# Patient Record
Sex: FEMALE | Race: WHITE
[De-identification: names, ages, dates, MRNs, and addresses within clinical notes are randomized per-mention and may not be internally consistent; named-entity substitution may affect disease eponyms.]

---

## 2020-06-23 ENCOUNTER — HOSPITAL ENCOUNTER (INPATIENT)
Dept: HOSPITAL 41 - JD.OB | Age: 35
LOS: 1 days | Discharge: HOME | DRG: 560 | End: 2020-06-24
Attending: OBSTETRICS & GYNECOLOGY | Admitting: OBSTETRICS & GYNECOLOGY
Payer: COMMERCIAL

## 2020-06-23 DIAGNOSIS — Z3A.39: ICD-10-CM

## 2020-06-23 DIAGNOSIS — O40.3XX0: Primary | ICD-10-CM

## 2020-06-23 PROCEDURE — 3E0R3BZ INTRODUCTION OF ANESTHETIC AGENT INTO SPINAL CANAL, PERCUTANEOUS APPROACH: ICD-10-PCS | Performed by: OBSTETRICS & GYNECOLOGY

## 2020-06-23 PROCEDURE — 00HU33Z INSERTION OF INFUSION DEVICE INTO SPINAL CANAL, PERCUTANEOUS APPROACH: ICD-10-PCS | Performed by: OBSTETRICS & GYNECOLOGY

## 2020-06-23 PROCEDURE — 0KQM0ZZ REPAIR PERINEUM MUSCLE, OPEN APPROACH: ICD-10-PCS | Performed by: OBSTETRICS & GYNECOLOGY

## 2020-06-23 PROCEDURE — 3E033VJ INTRODUCTION OF OTHER HORMONE INTO PERIPHERAL VEIN, PERCUTANEOUS APPROACH: ICD-10-PCS | Performed by: OBSTETRICS & GYNECOLOGY

## 2020-06-23 PROCEDURE — 10907ZC DRAINAGE OF AMNIOTIC FLUID, THERAPEUTIC FROM PRODUCTS OF CONCEPTION, VIA NATURAL OR ARTIFICIAL OPENING: ICD-10-PCS | Performed by: OBSTETRICS & GYNECOLOGY

## 2020-06-23 RX ADMIN — WITCH HAZEL PRN TUB: 500 SOLUTION RECTAL; TOPICAL at 18:20

## 2020-06-23 NOTE — PCM.DEL
L & D Note





- General Info


Date of Service: 20





- Delivery Note


Labor: Induced by ARM, Induced by Oxytocin


Delivery Outcome: Livebirth


Infant Delivery Mode: Spontaneous


Birth Presentation: Right Occiput Anterior (MARYLOU)


Nuchal Cord: None


Anesthesia Type: Epidural


Amniotic Fluid Description: Clear


Episiotomy Type: None


Laceration: 2nd Degree


Suture type: Vicryl


Suture size: 2-0


Placenta: Intact, Spontaneous


Cord: 3 Vessels


Estimated Blood Loss: 200


Resuscitation Needed: Yes


: Bulb Syringe, Stimulated, Warmed, [place] Used


Delivery Comments (Free Text/Narrative):: 





Patient found to be complete and began pushing.  With maternal pushing effort 

fetal head delivered from an MARYLOU presentation.  No nuchal cord present.  With 

gentle downward traction the fetal shoulders and body delivered.  Infant placed 

on maternal abdomen.  Cord clamped and cut.  Cord blood obtained.  Placenta 

allowed time to separate and expelled intact.  Inspection of the perineum showed

a 2nd degree laceration which was repaired with a 2-0 Vicryl in the typical 

fashion 





- General Info


Date of Service: 20





- Patient Data


Vitals - Most Recent: 


                                Last Vital Signs











Temp  36.6 C   20 03:33


 


Pulse  84   20 03:33


 


Resp  16   20 03:33


 


BP  118/81   20 03:33


 


Pulse Ox  98   20 03:33











Weight - Most Recent: 91.989 kg


I&O - Last 24 Hours: 


                                 Intake & Output











 20





 06:59 14:59 22:59


 


Intake Total 100  3500


 


Output Total  100 


 


Balance 100 -100 3500











Lab Results Last 24 Hours: 


                         Laboratory Results - last 24 hr











  20 Range/Units





  03:50 03:50 


 


WBC  11.53 H   (3.98-10.04)  K/mm3


 


RBC  4.32   (3.98-5.22)  M/mm3


 


Hgb  12.1   (11.2-15.7)  gm/dl


 


Hct  37.2   (34.1-44.9)  %


 


MCV  86.1   (79.4-94.8)  fl


 


MCH  28.0   (25.6-32.2)  pg


 


MCHC  32.5   (32.2-35.5)  g/dl


 


RDW Std Deviation  41.5   (36.4-46.3)  fL


 


Plt Count  326   (182-369)  K/mm3


 


MPV  10.5   (9.4-12.3)  fl


 


Neut % (Auto)  69.3   (34.0-71.1)  %


 


Lymph % (Auto)  21.9   (19.3-51.7)  %


 


Mono % (Auto)  7.3   (4.7-12.5)  %


 


Eos % (Auto)  0.9   (0.7-5.8)  


 


Baso % (Auto)  0.3   (0.1-1.2)  %


 


Neut # (Auto)  7.99 H   (1.56-6.13)  K/mm3


 


Lymph # (Auto)  2.53   (1.18-3.74)  K/mm3


 


Mono # (Auto)  0.84 H   (0.24-0.36)  K/mm3


 


Eos # (Auto)  0.10   (0.04-0.36)  K/mm3


 


Baso # (Auto)  0.03   (0.01-0.08)  K/mm3


 


Blood Type   O NEGATIVE  


 


Gel Antibody Screen   Positive  











Med Orders - Current: 


                               Current Medications





Calcium Carbonate/Glycine (Tums)  1,000 mg PO Q2H PRN


   PRN Reason: Indigestion


Ephedrine Sulfate (Ephedrine Sulfate)  5 mg IVPUSH ASDIRECTED PRN


   PRN Reason: Hypotension


Fentanyl (Sublimaze)  100 mcg EPIDUR Q3H PRN


   PRN Reason: Pain


   Last Admin: 20 09:42 Dose:  100 mcg


   Documented by: 


Fentanyl/Bupivacaine HCl (Fentanyl/Bupivacaine/Ns 2 Mcg-0.125% 100 Ml)  100 ml 

EPIDUR ASDIRECTED CARIE


   Last Admin: 20 09:41 Dose:  100 ml


   Documented by: 


Ampicillin Sodium 1 gm/ Sodium (Chloride)  100 mls @ 200 mls/hr IV Q4H CARIE


   Last Admin: 20 12:23 Dose:  200 mls/hr


   Documented by: 


Oxytocin/Lactated Ringer's (Pitocin In Lr 10 Units/1,000 Ml)  10 unit in 1,000 

mls @ 12 mls/hr IV TITRATE CARIE; Protocol


   Last Titration: 20 14:50 Dose:  83.33 munits/min, 500 mls/hr


   Documented by: 


Oxytocin/Lactated Ringer's (Pitocin In Lr 10 Units/1,000 Ml)  10 unit in 1,000 

mls @ 100 mls/hr IV .CONTINUOUS CARIE; Protocol


Lactated Ringer's (Ringers, Lactated)  1,000 mls @ 100 mls/hr IV ASDIRECTED CARIE


   Last Admin: 20 13:28 Dose:  999 mls/hr


   Documented by: 


Phenylephrine HCl 1 mg/ Sodium (Chloride)  10.1 mls @ 1 mls/sec IV TITRATE CARIE; 

Protocol


Nalbuphine HCl (Nubain)  10 mg IVPUSH Q2H PRN


   PRN Reason: Pain


   Last Admin: 20 10:30 Dose:  10 mg


   Documented by: 


Ondansetron HCl (Zofran)  4 mg IVPUSH Q4H PRN


   PRN Reason: Nausea/Vomiting


Ondansetron HCl (Zofran)  4 mg IVPUSH ONETIME PRN


   PRN Reason: Nausea/Vomiting


Sodium Chloride (Saline Flush)  10 ml FLUSH ASDIRECTED PRN


   PRN Reason: Keep Vein Open





Discontinued Medications





Ampicillin Sodium 2 gm/ Sodium (Chloride)  100 mls @ 200 mls/hr IV ONETIME ONE


   Stop: 20 04:01


   Last Admin: 20 04:05 Dose:  200 mls/hr


   Documented by: 


Lidocaine HCl (Xylocaine 1%)  50 ml INJECT ONETIME ONE


   Stop: 20 03:33











- Problem List & Annotations


(1) 39 weeks gestation of pregnancy


SNOMED Code(s): 30991194


   Code(s): Z3A.39 - 39 WEEKS GESTATION OF PREGNANCY   Status: Acute   Current 

Visit: Yes   





(2) Rh negative state in antepartum period


SNOMED Code(s): 523191430


   Code(s): O26.899 - OTH PREGNANCY RELATED CONDITIONS, UNSPECIFIED TRIMESTER; 

Z67.91 - UNSPECIFIED BLOOD TYPE, RH NEGATIVE   Status: Acute   Current Visit: Y

es   





(3) GBS (group B Streptococcus carrier), +RV culture, currently pregnant


SNOMED Code(s): 6282718979917, 975670751, 8958785328314


   Code(s): O99.820 - STREPTOCOCCUS B CARRIER STATE COMPLICATING PREGNANCY   

Status: Acute   Current Visit: Yes   





(4) Polyhydramnios


SNOMED Code(s): 91821457


   Code(s): O40.9XX0 - POLYHYDRAMNIOS, UNSP TRIMESTER, NOT APPLICABLE OR UNSP   

Status: Acute   Current Visit: Yes   


Qualifiers: 


   Fetus number: single or unspecified fetus   Trimester: third trimester   

Qualified Code(s): O40.3XX0 - Polyhydramnios, third trimester, not applicable or

unspecified   





(5) Meconium stained amniotic fluid, delivered, current hospitalization


SNOMED Code(s): 454676862, 363967608


   Code(s): O77.0 - LABOR AND DELIVERY COMPLICATED BY MECONIUM IN AMNIOTIC FLUID

  Status: Acute   Current Visit: Yes   





(6) Vaginal delivery


SNOMED Code(s): 248013072


   Code(s): O80 - ENCOUNTER FOR FULL-TERM UNCOMPLICATED DELIVERY   Status: Acute

  Current Visit: Yes   





- Problem List Review


Problem List Initiated/Reviewed/Updated: Yes





- My Orders


Last 24 Hours: 


My Active Orders





20 03:32


Urinary Catheter Assessment [RC] ASDIRECTED 


Calcium Carbonate [Tums]   1,000 mg PO Q2H PRN 


Nalbuphine [Nubain]   10 mg IVPUSH Q2H PRN 


Ondansetron [Zofran]   4 mg IVPUSH Q4H PRN 


Sodium Chloride 0.9% [Saline Flush]   10 ml FLUSH ASDIRECTED PRN 


Resuscitation Status Routine 





20 03:33


Patient Status [ADT] Routine 


Activity as Tolerated [RC] PFP 


Communication Order [RC] ASDIRECTED 


Fetal Heart Tones [RC] ASDIRECTED 


Notify Provider [RC] PFP 


Notify Provider [RC] PRN 


Peripheral IV Care [RC] .AS DIRECTED 


Vital Signs [RC] PER UNIT ROUTINE 


Electronic Fetal Heart Tones Ext w TOCO [WOMSER] Routine 


Electronic Fetal Heart Tones Internal [WOMSER] Per Unit Routine 


Peripheral IV Insertion Adult [OM.PC] Routine 





20 03:45


Lactated Ringers [Ringers, Lactated] 1,000 ml IV ASDIRECTED 


Oxytocin/Lactated Ringers [Pitocin in LR 10 Units/1,000 ML] 10 unit in 1,000 ml 

IV .CONTINUOUS 


Oxytocin/Lactated Ringers [Pitocin in LR 10 Units/1,000 ML] 10 unit in 1,000 ml 

IV TITRATE 





20 03:50


ANTIBODY IDENTIFICATION [BBK] Stat 


RAPID PLASMA REAGIN,RPR [CHEM] Routine 


TYPE AND SCREEN [BBK] Stat 





20 Breakfast


Regular Diet [DIET] 





20 07:30


Ampicillin 1 gm   Sodium Chloride 0.9% [Normal Saline] 100 ml IV Q4H 





20 17:26


Patient Status Manage Transfer [TRANSFER] Routine 














- Assessment


Assessment:: 





PPD#0





- Plan


Plan:: 





* Routine cares 


* Breast feeding 


* Discharge home in 1-2 days 


* Assess for need of additional Rhogam after delivery

## 2020-06-23 NOTE — PCM.PREANE
Preanesthetic Assessment





- Anesthesia/Transfusion/Family Hx


Anesthesia History: Prior Anesthesia Without Reaction


Family History of Anesthesia Reaction: No


Transfusion History: No Prior Transfusion(s)


Intubation History: Unknown





- Review of Systems


General: No Symptoms (sore throat/seasonal allergies.)


Pulmonary: No Symptoms


Cardiovascular: Palpitations


Gastrointestinal: No Symptoms (GERD with pregnancy)


Neurological: No Symptoms


Other: Reports: Sinus Problem (seasonal allergies), Depression





- Physical Assessment


NPO Status Date: 20


NPO Status Time: 07:30


Vital Signs: 





                                Last Vital Signs











Temp  36.6 C   20 03:33


 


Pulse  84   20 03:33


 


Resp  16   20 03:33


 


BP  118/81   20 03:33


 


Pulse Ox  98   20 03:33











Height: 1.63 m


Weight: 91.989 kg


ASA Class: 2


Mental Status: Alert & Oriented x3


Airway Class: Mallampati = 2


Dentition: Reports: Normal Dentition, Caries


Thyro-Mental Finger Breadths: 3


Mouth Opening Finger Breadths: 3


ROM/Head Extension: Full


Lungs: Clear to Auscultation, Normal Respiratory Effort


Cardiovascular: Regular Rate, Regular Rhythm, No Murmurs





- Lab


Values: 





                             Laboratory Last Values











WBC  11.53 K/mm3 (3.98-10.04)  H  20  03:50    


 


RBC  4.32 M/mm3 (3.98-5.22)   20  03:50    


 


Hgb  12.1 gm/dl (11.2-15.7)   20  03:50    


 


Hct  37.2 % (34.1-44.9)   20  03:50    


 


MCV  86.1 fl (79.4-94.8)   20  03:50    


 


MCH  28.0 pg (25.6-32.2)   20  03:50    


 


MCHC  32.5 g/dl (32.2-35.5)   20  03:50    


 


RDW Std Deviation  41.5 fL (36.4-46.3)   20  03:50    


 


Plt Count  326 K/mm3 (182-369)   20  03:50    


 


MPV  10.5 fl (9.4-12.3)   20  03:50    


 


Neut % (Auto)  69.3 % (34.0-71.1)   20  03:50    


 


Lymph % (Auto)  21.9 % (19.3-51.7)   20  03:50    


 


Mono % (Auto)  7.3 % (4.7-12.5)   20  03:50    


 


Eos % (Auto)  0.9  (0.7-5.8)   20  03:50    


 


Baso % (Auto)  0.3 % (0.1-1.2)   20  03:50    


 


Neut # (Auto)  7.99 K/mm3 (1.56-6.13)  H  20  03:50    


 


Lymph # (Auto)  2.53 K/mm3 (1.18-3.74)   20  03:50    


 


Mono # (Auto)  0.84 K/mm3 (0.24-0.36)  H  20  03:50    


 


Eos # (Auto)  0.10 K/mm3 (0.04-0.36)   20  03:50    


 


Baso # (Auto)  0.03 K/mm3 (0.01-0.08)   20  03:50    


 


Blood Type  O NEGATIVE   20  03:50    








Above labs reviewed and noted and within acceptable ranges to proceed with 

epidural if desired.





- Allergies


Allergies/Adverse Reactions: 


                                    Allergies











Allergy/AdvReac Type Severity Reaction Status Date / Time


 


Dairy Products Allergy  Diarrhea Verified 20 03:35














- Anesthesia Plan


Pre-Op Medication Ordered: None





- Acknowledgements


Anesthesia Type Planned: Epidural


Pt an Appropriate Candidate for the Planned Anesthesia: Yes


Alternatives and Risks of Anesthesia Discussed w Pt/Guardian: Yes


Pt/Guardian Understands and Agrees with Anesthesia Plan: Yes





PreAnesthesia Questionnaire


OB/GYN History: Reports: Pregnancy, Spontaneous , Other (See Below)


Other OB/BYN History: polyhydramnios


Psychiatric History: Reports: Depression


Immunologic History: Reports: Other (See Below)


Other Immunologic History: EBV mono





- Infectious Disease History


Infectious Disease History: Reports: Chicken Pox





- Past Surgical History


HEENT Surgical History: Reports: Myringotomy w Tube(s), Oral Surgery, Other (See

Below)


Other HEENT Surgeries/Procedures: cauterization of adenoids 





- SUBSTANCE USE


Smoking Status *Q: Never Smoker


Recreational Drug Use History: No





- HOME MEDS


Home Medications: 


                                    Home Meds





Prenatal Vits #93/Iron Fum/FA [Prenatal Formula Tablet] 1 each PO 06/15/20 

[History]











- CURRENT (IN HOUSE) MEDS


Current Meds: 





                               Current Medications





Calcium Carbonate/Glycine (Tums)  1,000 mg PO Q2H PRN


   PRN Reason: Indigestion


Ampicillin Sodium 1 gm/ Sodium (Chloride)  100 mls @ 200 mls/hr IV Q4H CARIE


Oxytocin/Lactated Ringer's (Pitocin In Lr 10 Units/1,000 Ml)  10 unit in 1,000 

mls @ 12 mls/hr IV TITRATE CARIE; Protocol


   Last Titration: 20 06:51 Dose:  4 munits/min, 24 mls/hr


   Documented by: 


Oxytocin/Lactated Ringer's (Pitocin In Lr 10 Units/1,000 Ml)  10 unit in 1,000 

mls @ 100 mls/hr IV .CONTINUOUS CARIE; Protocol


Lactated Ringer's (Ringers, Lactated)  1,000 mls @ 100 mls/hr IV ASDIRECTED CARIE


   Last Admin: 20 06:17 Dose:  100 mls/hr


   Documented by: 


Nalbuphine HCl (Nubain)  10 mg IVPUSH Q2H PRN


   PRN Reason: Pain


Ondansetron HCl (Zofran)  4 mg IVPUSH Q4H PRN


   PRN Reason: Nausea/Vomiting


Sodium Chloride (Saline Flush)  10 ml FLUSH ASDIRECTED PRN


   PRN Reason: Keep Vein Open





Discontinued Medications





Ampicillin Sodium 2 gm/ Sodium (Chloride)  100 mls @ 200 mls/hr IV ONETIME ONE


   Stop: 20 04:01


   Last Admin: 20 04:05 Dose:  200 mls/hr


   Documented by: 


Lidocaine HCl (Xylocaine 1%)  50 ml INJECT ONETIME ONE


   Stop: 20 03:33

## 2020-06-23 NOTE — PCM.LDHP
L&D History of Present Illness





- General


Date of Service: 20


Admit Problem/Dx: 


                   Patient Status Order with Admit Dx/Problem





20 03:33


Patient Status [ADT] Routine 








                           Admission Diagnosis/Problem











Admission Diagnosis/Problem    Pregnancy














Source of Information: Patient


History Limitations: Reports: No Limitations





- History of Present Illness


Introduction:: 





Patient is a 36 y/o  at 39 3/7 wks who presented in early labor / also 

supposed to be induced today.  Doing well.  No major issues 





- Related Data


Allergies/Adverse Reactions: 


                                    Allergies











Allergy/AdvReac Type Severity Reaction Status Date / Time


 


Dairy Products Allergy  Diarrhea Verified 20 03:35











Home Medications: 


                                    Home Meds





Prenatal Vits #93/Iron Fum/FA [Prenatal Formula Tablet] 1 each PO 06/15/20 

[History]











Past Medical History


OB/GYN History: Reports: Pregnancy, Spontaneous 


: 4


Para: 2


LMP (Approximate): Pregnant


Other OB/BYN History: polyhydramnios


Psychiatric History: Reports: Depression





- Infectious Disease History


Infectious Disease History: Reports: Chicken Pox





- Past Surgical History


HEENT Surgical History: Reports: Myringotomy w Tube(s), Oral Surgery (tooth 

extraction), Other (See Below)


Other HEENT Surgeries/Procedures: cauterization of adenoids 





Social & Family History





- Tobacco Use


Smoking Status *Q: Never Smoker





- Alcohol Use


Alcohol Use History: No





- Recreational Drug Use


Recreational Drug Use: No





H&P Review of Systems





- Review of Systems:


Review Of Systems: See Below


General: Reports: No Symptoms


Pulmonary: Reports: No Symptoms


Cardiovascular: Reports: No Symptoms


Gastrointestinal: Reports: No Symptoms


Genitourinary: Reports: No Symptoms


Musculoskeletal: Reports: No Symptoms


Psychiatric: Reports: Anxiety


Neurological: Reports: No Symptoms





L&D Exam





- Exam


Exam: See Below





- Vital Signs


Vital Signs: 


                                Last Vital Signs











Temp  36.6 C   20 03:33


 


Pulse  84   20 03:33


 


Resp  16   20 03:33


 


BP  118/81   20 03:33


 


Pulse Ox  98   20 03:33











Weight: 91.989 kg





- OB Specific


Contraction Intensity: Mild


Fetal Movement: Active


Fetal Heart Tones: Present


Fetal Heart Tones per Min: 125


Fetal Heart Rate (FHR) Variability: Moderate (6-25 bmp)


Birth Presentation: Vertex





- Limon Score


Limon Score Cervix Position: Midposition


Limon Score Consistency: Soft


Limon Score Effacement: 51-70%


Limon Score Dilation: 1-2 cm


Limon Score Infant's Station: -3


Limon Score Total: 6





- Exam


General: Alert, Oriented, Cooperative


Lungs: Clear to Auscultation, Normal Respiratory Effort


Cardiovascular: Regular Rate, Regular Rhythm


GI/Abdominal Exam: Soft, Non-Tender


Genitourinary: Normal external exam


Extremities: Normal Inspection


Skin: Warm, Dry, Intact





- Patient Data


Lab Results Last 24 hrs: 


                         Laboratory Results - last 24 hr











  20 Range/Units





  03:50 03:50 


 


WBC  11.53 H   (3.98-10.04)  K/mm3


 


RBC  4.32   (3.98-5.22)  M/mm3


 


Hgb  12.1   (11.2-15.7)  gm/dl


 


Hct  37.2   (34.1-44.9)  %


 


MCV  86.1   (79.4-94.8)  fl


 


MCH  28.0   (25.6-32.2)  pg


 


MCHC  32.5   (32.2-35.5)  g/dl


 


RDW Std Deviation  41.5   (36.4-46.3)  fL


 


Plt Count  326   (182-369)  K/mm3


 


MPV  10.5   (9.4-12.3)  fl


 


Neut % (Auto)  69.3   (34.0-71.1)  %


 


Lymph % (Auto)  21.9   (19.3-51.7)  %


 


Mono % (Auto)  7.3   (4.7-12.5)  %


 


Eos % (Auto)  0.9   (0.7-5.8)  


 


Baso % (Auto)  0.3   (0.1-1.2)  %


 


Neut # (Auto)  7.99 H   (1.56-6.13)  K/mm3


 


Lymph # (Auto)  2.53   (1.18-3.74)  K/mm3


 


Mono # (Auto)  0.84 H   (0.24-0.36)  K/mm3


 


Eos # (Auto)  0.10   (0.04-0.36)  K/mm3


 


Baso # (Auto)  0.03   (0.01-0.08)  K/mm3


 


Blood Type   O NEGATIVE  











Result Diagrams: 


                                 20 03:50








- Problem List


(1) 39 weeks gestation of pregnancy


SNOMED Code(s): 15658148


   ICD Code: Z3A.39 - 39 WEEKS GESTATION OF PREGNANCY   Status: Acute   Current 

Visit: Yes   





(2) Rh negative state in antepartum period


SNOMED Code(s): 268390805


   ICD Code: O26.899 - OTH PREGNANCY RELATED CONDITIONS, UNSPECIFIED TRIMESTER; 

Z67.91 - UNSPECIFIED BLOOD TYPE, RH NEGATIVE   Status: Acute   Current Visit: 

Yes   





(3) GBS (group B Streptococcus carrier), +RV culture, currently pregnant


SNOMED Code(s): 4794383053709, 236693461, 9933539588831


   ICD Code: O99.820 - STREPTOCOCCUS B CARRIER STATE COMPLICATING PREGNANCY   

Status: Acute   Current Visit: Yes   





(4) Polyhydramnios


SNOMED Code(s): 53201330


   ICD Code: O40.9XX0 - POLYHYDRAMNIOS, UNSP TRIMESTER, NOT APPLICABLE OR UNSP  

 Status: Acute   Current Visit: Yes   


Qualifiers: 


   Fetus number: single or unspecified fetus   Trimester: third trimester   

Qualified Code(s): O40.3XX0 - Polyhydramnios, third trimester, not applicable or

 unspecified   


Problem List Initiated/Reviewed/Updated: Yes


Orders Last 24hrs: 


                               Active Orders 24 hr











 Category Date Time Status


 


 Patient Status [ADT] Routine ADT  20 03:33 Active


 


 Activity as Tolerated [RC] PFP Care  20 03:33 Active


 


 Communication Order [RC] ASDIRECTED Care  20 03:33 Active


 


 Fetal Heart Tones [RC] ASDIRECTED Care  20 03:33 Active


 


 Notify Provider [RC] PFP Care  20 03:33 Active


 


 Notify Provider [RC] PRN Care  20 03:33 Active


 


 Peripheral IV Care [RC] .AS DIRECTED Care  20 03:33 Active


 


 Urinary Catheter Assessment [RC] ASDIRECTED Care  20 03:32 Active


 


 Vital Signs [RC] PER UNIT ROUTINE Care  20 03:33 Active


 


 Regular Diet [DIET] Diet  20 Breakfast Active


 


 PATIENT RETYPE [BBK] Routine Lab  20 05:22 Ordered


 


 RAPID PLASMA REAGIN,RPR [CHEM] Routine Lab  20 03:50 Received


 


 TYPE AND SCREEN [BBK] Stat Lab  20 03:50 Results


 


 Ampicillin 1 gm Med  20 07:30 Active





 Sodium Chloride 0.9% [Normal Saline] 100 ml   





 IV Q4H   


 


 Calcium Carbonate [Tums] Med  20 03:32 Active





 1,000 mg PO Q2H PRN   


 


 Lactated Ringers [Ringers, Lactated] 1,000 ml Med  20 03:45 Active





 IV ASDIRECTED   


 


 Nalbuphine [Nubain] Med  20 03:32 Active





 10 mg IVPUSH Q2H PRN   


 


 Ondansetron [Zofran] Med  20 03:32 Active





 4 mg IVPUSH Q4H PRN   


 


 Oxytocin/Lactated Ringers [Pitocin in LR 10 Units/1,000 Med  20 03:45 

Active





 ML]   





 10 unit in 1,000 ml IV .CONTINUOUS   


 


 Oxytocin/Lactated Ringers [Pitocin in LR 10 Units/1,000 Med  20 03:45 

Active





 ML]   





 10 unit in 1,000 ml IV TITRATE   


 


 Sodium Chloride 0.9% [Saline Flush] Med  20 03:32 Active





 10 ml FLUSH ASDIRECTED PRN   


 


 Electronic Fetal Heart Tones Ext w TOCO [WOMSER] Oth  20 03:33 Ordered





 Routine   


 


 Electronic Fetal Heart Tones Internal [WOMSER] Per Unit Oth  20 03:33 

Ordered





 Routine   


 


 Peripheral IV Insertion Adult [OM.PC] Routine Oth  20 03:33 Ordered


 


 Resuscitation Status Routine Resus Stat  20 03:32 Ordered








                                Medication Orders





Calcium Carbonate/Glycine (Tums)  1,000 mg PO Q2H PRN


   PRN Reason: Indigestion


Ampicillin Sodium 1 gm/ Sodium (Chloride)  100 mls @ 200 mls/hr IV Q4H CARIE


Oxytocin/Lactated Ringer's (Pitocin In Lr 10 Units/1,000 Ml)  10 unit in 1,000 

mls @ 12 mls/hr IV TITRATE CARIE; Protocol


   Last Admin: 20 06:18  Dose: 2 munits/min, 12 mls/hr


   Documented by: ELKE


Oxytocin/Lactated Ringer's (Pitocin In Lr 10 Units/1,000 Ml)  10 unit in 1,000 

mls @ 100 mls/hr IV .CONTINUOUS CARIE; Protocol


Lactated Ringer's (Ringers, Lactated)  1,000 mls @ 100 mls/hr IV ASDIRECTED CARIE


   Last Admin: 20 06:17  Dose: 100 mls/hr


   Documented by: ELKE


Nalbuphine HCl (Nubain)  10 mg IVPUSH Q2H PRN


   PRN Reason: Pain


Ondansetron HCl (Zofran)  4 mg IVPUSH Q4H PRN


   PRN Reason: Nausea/Vomiting


Sodium Chloride (Saline Flush)  10 ml FLUSH ASDIRECTED PRN


   PRN Reason: Keep Vein Open








Assessment/Plan Comment:: 





* Labs done


* Ampicillin for GBS positive status 


* Was noted to have mild polyhydramnios around 36 weeks, has normalized on 

  recent scan 


* Pitocin for augmentation, AROM when safe/able 


* Pain management per patient preference 


* Anticipate 


* Assess for need of additional Rhogam after delivery

## 2020-06-24 RX ADMIN — WITCH HAZEL PRN CONTAINER: 500 SOLUTION RECTAL; TOPICAL at 13:58

## 2020-06-24 NOTE — PCM.DCSUM1
**Discharge Summary





- Discharge Data


Discharge Date: 20


Discharge Disposition: Home, Self-Care 01


Condition: Good





- Referral to Home Health


Primary Care Physician: 


Adri Madden MD








- Discharge Diagnosis/Problem(s)


(1) 39 weeks gestation of pregnancy


SNOMED Code(s): 92060471


   ICD Code: Z3A.39 - 39 WEEKS GESTATION OF PREGNANCY   Status: Acute   





(2) Rh negative state in antepartum period


SNOMED Code(s): 439153239


   ICD Code: O26.899 - OTH PREGNANCY RELATED CONDITIONS, UNSPECIFIED TRIMESTER; 

Z67.91 - UNSPECIFIED BLOOD TYPE, RH NEGATIVE   Status: Acute   





(3) GBS (group B Streptococcus carrier), +RV culture, currently pregnant


SNOMED Code(s): 0373867970538, 526994196, 4735093273629


   ICD Code: O99.820 - STREPTOCOCCUS B CARRIER STATE COMPLICATING PREGNANCY   

Status: Acute   





(4) Polyhydramnios


SNOMED Code(s): 95215599


   ICD Code: O40.9XX0 - POLYHYDRAMNIOS, UNSP TRIMESTER, NOT APPLICABLE OR UNSP  

Status: Acute   


Qualifiers: 


   Fetus number: single or unspecified fetus   Trimester: third trimester   

Qualified Code(s): O40.3XX0 - Polyhydramnios, third trimester, not applicable or

unspecified   





(5) Meconium stained amniotic fluid, delivered, current hospitalization


SNOMED Code(s): 663225577, 494392692


   ICD Code: O77.0 - LABOR AND DELIVERY COMPLICATED BY MECONIUM IN AMNIOTIC 

FLUID   Status: Acute   





(6) Vaginal delivery


SNOMED Code(s): 164999793


   ICD Code: O80 - ENCOUNTER FOR FULL-TERM UNCOMPLICATED DELIVERY   Status: 

Acute   





- Patient Summary/Data


Complications: None


Consults: 


None


Recommended Follow-up Testing/Procedures: 


Follow up in 3 weeks


Hospital Course: 


Patient is a 36 y/o  at 39 3/7 wks who presented for IOL.  Done with 

pitocin and AROM.  PRogressed well to complete dilation and underwent an 

uncomplicated .  See delivery note.  Postpartum did well and was discharged 

home on PPD#1








- Patient Instructions


Diet: Regular Diet as Tolerated


Activity: As Tolerated


Activity, Other: Pelvic rest for 6 weeks 


Driving: May Drive Today


Showering/Bathing: May Shower


Showering/Bathing, Other: May Bathe 


Notify Provider of: Fever, Increased Pain, Swelling and Redness, Drainage, 

Nausea and/or Vomiting





- Discharge Plan


*PRESCRIPTION DRUG MONITORING PROGRAM REVIEWED*: No


*COPY OF PRESCRIPTION DRUG MONITORING REPORT IN PATIENT JOZEF: No


Home Medications: 


                                    Home Meds





Prenatal Vits #93/Iron Fum/FA [Prenatal Formula Tablet] 1 each PO 06/15/20 

[History]


Docusate Sodium [Colace] 100 mg PO BID PRN  cap 20 [Rx]


Ibuprofen [Motrin] 600 mg PO Q4H PRN  tablet 20 [Rx]








Patient Handouts:  Breast Pumping Tips, Breastfeeding and Self-Care, Postpartum 

Care After Vaginal Delivery


Referrals: 


Adri Madden MD [Primary Care Provider] -  (3 weeks for postpartum check )





- Discharge Summary/Plan Comment


DC Time >30 min.: No





- Patient Data


Vitals - Most Recent: 


                                Last Vital Signs











Temp  36.4 C   20 09:40


 


Pulse  87   20 09:40


 


Resp  14   20 09:40


 


BP  126/67   20 09:40


 


Pulse Ox  100   20 09:40











Weight - Most Recent: 91.989 kg


Med Orders - Current: 


                               Current Medications





Acetaminophen (Tylenol)  650 mg PO Q4H PRN


   PRN Reason: mild pain or fever


   Last Admin: 20 20:48 Dose:  650 mg


   Documented by: 


Benzocaine/Menthol (Dermoplast Pain Relief Spray)  0 gm TOP ASDIRECTED PRN


   PRN Reason: Perineal Comfort Measure


   Last Admin: 20 18:20 Dose:  1 can


   Documented by: 


Docusate Sodium (Colace)  100 mg PO BID PRN


   PRN Reason: Constipation


   Last Admin: 20 12:25 Dose:  100 mg


   Documented by: 


Ibuprofen (Motrin)  600 mg PO Q4H PRN


   PRN Reason: Mild pain or fever


   Last Admin: 20 12:25 Dose:  600 mg


   Documented by: 


Witch Hazel (Tucks)  1 pad TOP ASDIRECTED PRN


   PRN Reason: Perineal Comfort Measure


   Last Admin: 20 13:58 Dose:  1 container


   Documented by: 





Discontinued Medications





Bupivacaine HCl (Sensorcaine-Mpf 0.25%)  10 ml .ROUTE .STK-MED ONE


   Stop: 20 00:01


Calcium Carbonate/Glycine (Tums)  1,000 mg PO Q2H PRN


   PRN Reason: Indigestion


Ephedrine Sulfate (Ephedrine Sulfate)  5 mg IVPUSH ASDIRECTED PRN


   PRN Reason: Hypotension


Fentanyl (Sublimaze)  100 mcg EPIDUR Q3H PRN


   PRN Reason: Pain


   Last Admin: 20 09:42 Dose:  100 mcg


   Documented by: 


Fentanyl/Bupivacaine HCl (Fentanyl/Bupivacaine/Ns 2 Mcg-0.125% 100 Ml)  100 ml 

EPIDUR ASDIRECTED CARIE


   Last Admin: 20 09:41 Dose:  100 ml


   Documented by: 


Ampicillin Sodium 2 gm/ Sodium (Chloride)  100 mls @ 200 mls/hr IV ONETIME ONE


   Stop: 20 04:01


   Last Admin: 20 04:05 Dose:  200 mls/hr


   Documented by: 


Ampicillin Sodium 1 gm/ Sodium (Chloride)  100 mls @ 200 mls/hr IV Q4H CARIE


   Last Admin: 20 11:26 Dose:  Not Given


   Documented by: 


Oxytocin/Lactated Ringer's (Pitocin In Lr 10 Units/1,000 Ml)  10 unit in 1,000 

mls @ 12 mls/hr IV TITRATE CARIE; Protocol


   Last Titration: 20 14:50 Dose:  83.33 munits/min, 500 mls/hr


   Documented by: 


Oxytocin/Lactated Ringer's (Pitocin In Lr 10 Units/1,000 Ml)  10 unit in 1,000 

mls @ 100 mls/hr IV .CONTINUOUS CARIE; Protocol


Lactated Ringer's (Ringers, Lactated)  1,000 mls @ 100 mls/hr IV ASDIRECTED CARIE


   Last Admin: 20 13:28 Dose:  999 mls/hr


   Documented by: 


Phenylephrine HCl 1 mg/ Sodium (Chloride)  10.1 mls @ 1 mls/sec IV TITRATE CARIE; 

Protocol


Lidocaine HCl (Xylocaine 1%)  50 ml INJECT ONETIME ONE


   Stop: 20 03:33


   Last Admin: 20 03:55 Dose:  Not Given


   Documented by: 


Nalbuphine HCl (Nubain)  10 mg IVPUSH Q2H PRN


   PRN Reason: Pain


   Last Admin: 20 10:30 Dose:  10 mg


   Documented by: 


Ondansetron HCl (Zofran)  4 mg IVPUSH Q4H PRN


   PRN Reason: Nausea/Vomiting


Ondansetron HCl (Zofran)  4 mg IVPUSH ONETIME PRN


   PRN Reason: Nausea/Vomiting


Sodium Chloride (Saline Flush)  10 ml FLUSH ASDIRECTED PRN


   PRN Reason: Keep Vein Open

## 2020-06-24 NOTE — PCM.PNPP
- General Info


Date of Service: 20


Functional Status: Reports: Pain Controlled, Tolerating Diet, Ambulating, 

Urinating





- Review of Systems


General: Reports: No Symptoms


Pulmonary: Reports: No Symptoms


Cardiovascular: Reports: No Symptoms


Gastrointestinal: Reports: No Symptoms


Genitourinary: Reports: No Symptoms


Musculoskeletal: Reports: No Symptoms


Neurological: Reports: No Symptoms





- Patient Data


Vital Signs - Most Recent: 


                                Last Vital Signs











Temp  36.7 C   20 02:57


 


Pulse  81   20 02:57


 


Resp  14   20 02:57


 


BP  104/68   20 02:57


 


Pulse Ox  97   20 02:57











Weight - Most Recent: 91.989 kg


I&O - Last 24 Hours: 


                                 Intake & Output











 20





 14:59 22:59 06:59


 


Intake Total  3500 


 


Output Total 100  


 


Balance -100 3500 











Lab Results - Last 24 Hours: 


                         Laboratory Results - last 24 hr











  20 Range/Units





  03:50 


 


Blood Type  O NEGATIVE  


 


Gel Antibody Screen  Positive  











Med Orders - Current: 


                               Current Medications





Acetaminophen (Tylenol)  650 mg PO Q4H PRN


   PRN Reason: mild pain or fever


   Last Admin: 20 20:48 Dose:  650 mg


   Documented by: 


Benzocaine/Menthol (Dermoplast Pain Relief Spray)  0 gm TOP ASDIRECTED PRN


   PRN Reason: Perineal Comfort Measure


   Last Admin: 20 18:20 Dose:  1 can


   Documented by: 


Docusate Sodium (Colace)  100 mg PO BID PRN


   PRN Reason: Constipation


   Last Admin: 20 20:48 Dose:  100 mg


   Documented by: 


Ibuprofen (Motrin)  600 mg PO Q4H PRN


   PRN Reason: Mild pain or fever


   Last Admin: 20 02:59 Dose:  600 mg


   Documented by: 


Witch Hazel (Tucks)  1 pad TOP ASDIRECTED PRN


   PRN Reason: Perineal Comfort Measure


   Last Admin: 20 18:20 Dose:  1 tub


   Documented by: 





Discontinued Medications





Calcium Carbonate/Glycine (Tums)  1,000 mg PO Q2H PRN


   PRN Reason: Indigestion


Ephedrine Sulfate (Ephedrine Sulfate)  5 mg IVPUSH ASDIRECTED PRN


   PRN Reason: Hypotension


Fentanyl (Sublimaze)  100 mcg EPIDUR Q3H PRN


   PRN Reason: Pain


   Last Admin: 20 09:42 Dose:  100 mcg


   Documented by: 


Fentanyl/Bupivacaine HCl (Fentanyl/Bupivacaine/Ns 2 Mcg-0.125% 100 Ml)  100 ml 

EPIDUR ASDIRECTED CARIE


   Last Admin: 20 09:41 Dose:  100 ml


   Documented by: 


Ampicillin Sodium 2 gm/ Sodium (Chloride)  100 mls @ 200 mls/hr IV ONETIME ONE


   Stop: 20 04:01


   Last Admin: 20 04:05 Dose:  200 mls/hr


   Documented by: 


Ampicillin Sodium 1 gm/ Sodium (Chloride)  100 mls @ 200 mls/hr IV Q4H CARIE


   Last Admin: 20 12:23 Dose:  200 mls/hr


   Documented by: 


Oxytocin/Lactated Ringer's (Pitocin In Lr 10 Units/1,000 Ml)  10 unit in 1,000 

mls @ 12 mls/hr IV TITRATE CARIE; Protocol


   Last Titration: 20 14:50 Dose:  83.33 munits/min, 500 mls/hr


   Documented by: 


Oxytocin/Lactated Ringer's (Pitocin In Lr 10 Units/1,000 Ml)  10 unit in 1,000 

mls @ 100 mls/hr IV .CONTINUOUS CARIE; Protocol


Lactated Ringer's (Ringers, Lactated)  1,000 mls @ 100 mls/hr IV ASDIRECTED CARIE


   Last Admin: 20 13:28 Dose:  999 mls/hr


   Documented by: 


Phenylephrine HCl 1 mg/ Sodium (Chloride)  10.1 mls @ 1 mls/sec IV TITRATE CARIE; 

Protocol


Lidocaine HCl (Xylocaine 1%)  50 ml INJECT ONETIME ONE


   Stop: 20 03:33


   Last Admin: 20 03:55 Dose:  Not Given


   Documented by: 


Nalbuphine HCl (Nubain)  10 mg IVPUSH Q2H PRN


   PRN Reason: Pain


   Last Admin: 20 10:30 Dose:  10 mg


   Documented by: 


Ondansetron HCl (Zofran)  4 mg IVPUSH Q4H PRN


   PRN Reason: Nausea/Vomiting


Ondansetron HCl (Zofran)  4 mg IVPUSH ONETIME PRN


   PRN Reason: Nausea/Vomiting


Sodium Chloride (Saline Flush)  10 ml FLUSH ASDIRECTED PRN


   PRN Reason: Keep Vein Open











- Infant Interaction


Infant Disposition, Postpartum:  in Room with Family


Infant Interaction: Holding Infant


Infant Feeding: Bottle Fed Infant,  Infant; Nursed Well


Support Person: 





- Postpartum Recovery Exam


Fundal Tone: Firm


Fundal Level: At Umbilicus


Fundal Placement: Midline


Lochia Amount: Small, Moderate


Lochia Color: Rubra/Red


Perineum Description: Other (see below)


Other Perinuem Description: 2 nd degree repaired


Episiotomy/Laceration: Approximated


Bladder Status: Voiding


Urinary Elimination: Voided





- Exam


General: Alert, Oriented, Cooperative


GI/Abdominal Exam: Soft, Non-Tender


Extremities: Normal Inspection


Skin: Warm, Dry, Intact





- Problem List & Annotations


(1) 39 weeks gestation of pregnancy


SNOMED Code(s): 53062464


   Code(s): Z3A.39 - 39 WEEKS GESTATION OF PREGNANCY   Status: Acute   Current 

Visit: Yes   





(2) Rh negative state in antepartum period


SNOMED Code(s): 889376870


   Code(s): O26.899 - OTH PREGNANCY RELATED CONDITIONS, UNSPECIFIED TRIMESTER; 

Z67.91 - UNSPECIFIED BLOOD TYPE, RH NEGATIVE   Status: Acute   Current Visit: 

Yes   





(3) GBS (group B Streptococcus carrier), +RV culture, currently pregnant


SNOMED Code(s): 0735422580121, 852931346, 6947674557717


   Code(s): O99.820 - STREPTOCOCCUS B CARRIER STATE COMPLICATING PREGNANCY   

Status: Acute   Current Visit: Yes   





(4) Polyhydramnios


SNOMED Code(s): 09091024


   Code(s): O40.9XX0 - POLYHYDRAMNIOS, UNSP TRIMESTER, NOT APPLICABLE OR UNSP   

Status: Acute   Current Visit: Yes   


Qualifiers: 


   Fetus number: single or unspecified fetus   Trimester: third trimester   

Qualified Code(s): O40.3XX0 - Polyhydramnios, third trimester, not applicable or

unspecified   





(5) Meconium stained amniotic fluid, delivered, current hospitalization


SNOMED Code(s): 895340554, 734782946


   Code(s): O77.0 - LABOR AND DELIVERY COMPLICATED BY MECONIUM IN AMNIOTIC FLUID

  Status: Acute   Current Visit: Yes   





(6) Vaginal delivery


SNOMED Code(s): 326174473


   Code(s): O80 - ENCOUNTER FOR FULL-TERM UNCOMPLICATED DELIVERY   Status: Acute

  Current Visit: Yes   





- Problem List Review


Problem List Initiated/Reviewed/Updated: Yes





- My Orders


Last 24 Hours: 


My Active Orders





20 Dinner


Regular Diet [DIET] 





20 18:14


Acetaminophen [Tylenol]   650 mg PO Q4H PRN 


Benzocaine/Menthol [Dermoplast Pain Relief Spray]   See Dose Instructions  TOP 

ASDIRECTED PRN 


Docusate Sodium [Colace]   100 mg PO BID PRN 


Ibuprofen [Motrin]   600 mg PO Q4H PRN 


witch hazeL [Tucks]   1 pad TOP ASDIRECTED PRN 


Heat Therapy [OM.PC] PRN 





20 18:14


Activity as Tolerated [RC] PER UNIT ROUTINE 


Vital Signs [RC] 21,03,09,15 


Assess Lochia [WOMSER] Per Unit Routine 


Assess Uterine Involution [WOMSER] Per Unit Routine 


Breast Pump [WOMSER] Per Unit Routine 


Ice Therapy [OM.PC] Per Unit Routine 


Perineal Care [OM.PC] Per Unit Routine 


Peripheral IV Discontinue [OM.PC] Routine 


Sitz Bath [OM.PC] Per Unit Routine 





20 18:14


Heat Therapy [OM.PC] PRN 














- Assessment


Assessment:: 





PPD#1





- Plan


Plan:: 





* Routine cares 


* Breast feeding with some supplementation 


* Discharge home likely tomorrow due to Peds monitoring glucose 


* No need for Rhogam, baby Rh negative